# Patient Record
Sex: MALE | Race: WHITE
[De-identification: names, ages, dates, MRNs, and addresses within clinical notes are randomized per-mention and may not be internally consistent; named-entity substitution may affect disease eponyms.]

---

## 2019-07-19 ENCOUNTER — HOSPITAL ENCOUNTER (EMERGENCY)
Dept: HOSPITAL 95 - ER | Age: 74
Discharge: HOME | End: 2019-07-19
Payer: COMMERCIAL

## 2019-07-19 VITALS — BODY MASS INDEX: 36.32 KG/M2 | HEIGHT: 60 IN | WEIGHT: 184.99 LBS

## 2019-07-19 DIAGNOSIS — G40.909: ICD-10-CM

## 2019-07-19 DIAGNOSIS — M41.9: ICD-10-CM

## 2019-07-19 DIAGNOSIS — Z79.899: ICD-10-CM

## 2019-07-19 DIAGNOSIS — Z79.82: ICD-10-CM

## 2019-07-19 DIAGNOSIS — I10: Primary | ICD-10-CM

## 2019-12-15 ENCOUNTER — HOSPITAL ENCOUNTER (EMERGENCY)
Dept: HOSPITAL 95 - ER | Age: 74
Discharge: HOME | End: 2019-12-15
Payer: COMMERCIAL

## 2019-12-15 VITALS — WEIGHT: 170 LBS | HEIGHT: 60 IN | BODY MASS INDEX: 33.38 KG/M2

## 2019-12-15 DIAGNOSIS — R73.03: ICD-10-CM

## 2019-12-15 DIAGNOSIS — Z03.89: Primary | ICD-10-CM

## 2019-12-15 DIAGNOSIS — G40.909: ICD-10-CM

## 2019-12-15 DIAGNOSIS — Z79.899: ICD-10-CM

## 2019-12-15 DIAGNOSIS — Z79.84: ICD-10-CM

## 2019-12-15 DIAGNOSIS — Z79.82: ICD-10-CM

## 2019-12-15 DIAGNOSIS — M41.9: ICD-10-CM

## 2019-12-15 LAB
ALBUMIN SERPL BCP-MCNC: 3.5 G/DL (ref 3.4–5)
ALBUMIN/GLOB SERPL: 0.8 {RATIO} (ref 0.8–1.8)
ALT SERPL W P-5'-P-CCNC: 31 U/L (ref 12–78)
ANION GAP SERPL CALCULATED.4IONS-SCNC: 8 MMOL/L (ref 6–16)
AST SERPL W P-5'-P-CCNC: 27 U/L (ref 12–37)
BASOPHILS # BLD AUTO: 0.02 K/MM3 (ref 0–0.23)
BASOPHILS NFR BLD AUTO: 1 % (ref 0–2)
BILIRUB SERPL-MCNC: 0.3 MG/DL (ref 0.1–1)
BUN SERPL-MCNC: 16 MG/DL (ref 8–24)
CALCIUM SERPL-MCNC: 8.3 MG/DL (ref 8.5–10.1)
CHLORIDE SERPL-SCNC: 104 MMOL/L (ref 98–108)
CO2 SERPL-SCNC: 28 MMOL/L (ref 21–32)
CREAT SERPL-MCNC: 0.92 MG/DL (ref 0.6–1.2)
DEPRECATED RDW RBC AUTO: 46.5 FL (ref 35.1–46.3)
EOSINOPHIL # BLD AUTO: 0.38 K/MM3 (ref 0–0.68)
EOSINOPHIL NFR BLD AUTO: 9 % (ref 0–6)
ERYTHROCYTE [DISTWIDTH] IN BLOOD BY AUTOMATED COUNT: 12.7 % (ref 11.7–14.2)
GLOBULIN SER CALC-MCNC: 4.4 G/DL (ref 2.2–4)
GLUCOSE SERPL-MCNC: 111 MG/DL (ref 70–99)
HCT VFR BLD AUTO: 44.8 % (ref 37–53)
HGB BLD-MCNC: 14.6 G/DL (ref 13.5–17.5)
IMM GRANULOCYTES # BLD AUTO: 0.01 K/MM3 (ref 0–0.1)
IMM GRANULOCYTES NFR BLD AUTO: 0 % (ref 0–1)
LYMPHOCYTES # BLD AUTO: 0.91 K/MM3 (ref 0.84–5.2)
LYMPHOCYTES NFR BLD AUTO: 21 % (ref 21–46)
MCHC RBC AUTO-ENTMCNC: 32.6 G/DL (ref 31.5–36.5)
MCV RBC AUTO: 99 FL (ref 80–100)
MONOCYTES # BLD AUTO: 0.69 K/MM3 (ref 0.16–1.47)
MONOCYTES NFR BLD AUTO: 16 % (ref 4–13)
NEUTROPHILS # BLD AUTO: 2.38 K/MM3 (ref 1.96–9.15)
NEUTROPHILS NFR BLD AUTO: 54 % (ref 41–73)
NRBC # BLD AUTO: 0 K/MM3 (ref 0–0.02)
NRBC BLD AUTO-RTO: 0 /100 WBC (ref 0–0.2)
PLATELET # BLD AUTO: 201 K/MM3 (ref 150–400)
POTASSIUM SERPL-SCNC: 3.5 MMOL/L (ref 3.5–5.5)
PROT SERPL-MCNC: 7.9 G/DL (ref 6.4–8.2)
SODIUM SERPL-SCNC: 140 MMOL/L (ref 136–145)
SP GR SPEC: 1.02 (ref 1–1.02)
UROBILINOGEN UR STRIP-MCNC: (no result) MG/DL

## 2020-02-11 ENCOUNTER — HOSPITAL ENCOUNTER (OUTPATIENT)
Dept: HOSPITAL 95 - LAB SHORT | Age: 75
End: 2020-02-11
Attending: FAMILY MEDICINE
Payer: COMMERCIAL

## 2020-02-11 DIAGNOSIS — R82.90: Primary | ICD-10-CM

## 2020-02-11 LAB
SP GR SPEC: 1.01 (ref 1–1.02)
UROBILINOGEN UR STRIP-MCNC: (no result) MG/DL

## 2021-03-23 ENCOUNTER — HOSPITAL ENCOUNTER (INPATIENT)
Dept: HOSPITAL 95 - ER | Age: 76
LOS: 13 days | Discharge: HOME | DRG: 327 | End: 2021-04-05
Attending: INTERNAL MEDICINE | Admitting: INTERNAL MEDICINE
Payer: COMMERCIAL

## 2021-03-23 VITALS — HEIGHT: 60 IN | WEIGHT: 197.53 LBS | BODY MASS INDEX: 38.78 KG/M2

## 2021-03-23 DIAGNOSIS — E87.70: ICD-10-CM

## 2021-03-23 DIAGNOSIS — K80.20: ICD-10-CM

## 2021-03-23 DIAGNOSIS — G40.909: ICD-10-CM

## 2021-03-23 DIAGNOSIS — Z20.822: ICD-10-CM

## 2021-03-23 DIAGNOSIS — T18.2XXA: Primary | ICD-10-CM

## 2021-03-23 DIAGNOSIS — Z98.49: ICD-10-CM

## 2021-03-23 DIAGNOSIS — Z79.899: ICD-10-CM

## 2021-03-23 DIAGNOSIS — K59.09: ICD-10-CM

## 2021-03-23 DIAGNOSIS — Z79.82: ICD-10-CM

## 2021-03-23 DIAGNOSIS — Z79.84: ICD-10-CM

## 2021-03-23 DIAGNOSIS — Z66: ICD-10-CM

## 2021-03-23 DIAGNOSIS — I11.0: ICD-10-CM

## 2021-03-23 DIAGNOSIS — Z98.1: ICD-10-CM

## 2021-03-23 DIAGNOSIS — F41.9: ICD-10-CM

## 2021-03-23 DIAGNOSIS — E78.5: ICD-10-CM

## 2021-03-23 DIAGNOSIS — I35.1: ICD-10-CM

## 2021-03-23 DIAGNOSIS — M41.9: ICD-10-CM

## 2021-03-23 DIAGNOSIS — R13.10: ICD-10-CM

## 2021-03-23 DIAGNOSIS — E87.6: ICD-10-CM

## 2021-03-23 DIAGNOSIS — I50.42: ICD-10-CM

## 2021-03-23 DIAGNOSIS — E11.9: ICD-10-CM
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
## 2021-03-23 DIAGNOSIS — F81.89: ICD-10-CM

## 2021-03-23 DIAGNOSIS — G47.33: ICD-10-CM

## 2021-03-23 DIAGNOSIS — Z86.73: ICD-10-CM

## 2021-03-23 PROCEDURE — G0378 HOSPITAL OBSERVATION PER HR: HCPCS

## 2021-03-23 PROCEDURE — A9270 NON-COVERED ITEM OR SERVICE: HCPCS

## 2021-03-24 LAB
FLUAV RNA SPEC QL NAA+PROBE: NEGATIVE
FLUBV RNA SPEC QL NAA+PROBE: NEGATIVE
RSV RNA SPEC QL NAA+PROBE: NEGATIVE
SARS-COV-2 RNA RESP QL NAA+PROBE: NEGATIVE

## 2021-03-24 PROCEDURE — 0DJ08ZZ INSPECTION OF UPPER INTESTINAL TRACT, VIA NATURAL OR ARTIFICIAL OPENING ENDOSCOPIC: ICD-10-PCS | Performed by: INTERNAL MEDICINE

## 2021-03-24 NOTE — NUR
SHIFT SUMMARY
S/P DYSPHAGIA, ALERT BUT COMPLETELY NON VERBAL, PT APPEARS TO BE ABLE TO
UNDERSTAND BASIC INSTRUCTIONS REGARDING EXPLANATION OF CARE BEING PROVIDED BY
NURSING STAFF, TOLERATING PUREE CONSISTENCY PO AND MEDS, AMBULATES W/ FWW AND
GB W/ 1 PERSON ASSIST, PLAN TO GO NPO TODAY @ 1300 FOR PROCEDURE, NO ACUTE
EVENTS THIS SHIFT. CALL LIGHT IN REACH, WILL CONTINUE TO MONITOR AND REPORT TO
ONCOMING DAY RN.

## 2021-03-24 NOTE — NUR
03/24/21 6747 Kane Randall
Bite Block Placed. Patient to ENDO 1. History, Chart, Medications and
Allergies reviewed before start of procedure. MONITOR INTACT WITH
CONTINUOUS PULSE OXIMETRY AND INTERMITTENT BP. See Anesthesia record
DR PÉREZ

## 2021-03-24 NOTE — NUR
History, Chart, Medications and Allergies reviewed before start of
procedure.Patient confirms NPO status and agrees with scheduled surgery.
SPOKE TO [facility] HEALTH REPRESENTATIVE. BLOOD CONDENT SIGNED WITH
EDUARDO

## 2021-03-24 NOTE — NUR
SPOKE WITH DR. MACHADO AT THIS TIME, PT TO BE KEPT NPO FOR EGD TODAY. PER DR. MACHADO PT DOES NOT NEED LABS OR ANY FURTHER ORDERS AT THIS TIME.

## 2021-03-24 NOTE — NUR
POST OP: BEDSIDE REPORT RECEIVED FROM SALTY VIVAS. PT TO ROOM AT ABOUT 1820.
UPON ASSESSMENT VSS, PT ALERT, IS NON-VERBAL. PT HAD BM AND BEDDING CHANGED.
AMBULATED TO BATHROOM. PT SOUNDS NASEL CONGESTED AND/OR NEEDS TO COUGH UP
SPUTUM.  ATTEMPTED TO HAVE PT DEEP BREATHE AND COUGH WITHOUT ANY SUCCESS, PT
DOESN'T FOLLOW COMMANDS. SPO2 STABLE AT THIS TIME ON RA, SUCTION SET UP IN
ROOM FOR SAFETY. REPORT GIVEN TO HAZEL URBANO RN.

## 2021-03-25 LAB
ALBUMIN SERPL BCP-MCNC: 3.1 G/DL (ref 3.4–5)
ALBUMIN/GLOB SERPL: 0.9 {RATIO} (ref 0.8–1.8)
ALT SERPL W P-5'-P-CCNC: 31 U/L (ref 12–78)
ANION GAP SERPL CALCULATED.4IONS-SCNC: 4 MMOL/L (ref 6–16)
AST SERPL W P-5'-P-CCNC: 23 U/L (ref 12–37)
BASOPHILS # BLD AUTO: 0.02 K/MM3 (ref 0–0.23)
BASOPHILS NFR BLD AUTO: 0 % (ref 0–2)
BILIRUB SERPL-MCNC: 0.4 MG/DL (ref 0.1–1)
BUN SERPL-MCNC: 13 MG/DL (ref 8–24)
CALCIUM SERPL-MCNC: 8.9 MG/DL (ref 8.5–10.1)
CHLORIDE SERPL-SCNC: 105 MMOL/L (ref 98–108)
CO2 SERPL-SCNC: 30 MMOL/L (ref 21–32)
CREAT SERPL-MCNC: 0.8 MG/DL (ref 0.6–1.2)
DEPRECATED RDW RBC AUTO: 42.7 FL (ref 35.1–46.3)
EOSINOPHIL # BLD AUTO: 0.67 K/MM3 (ref 0–0.68)
EOSINOPHIL NFR BLD AUTO: 9 % (ref 0–6)
ERYTHROCYTE [DISTWIDTH] IN BLOOD BY AUTOMATED COUNT: 12.4 % (ref 11.7–14.2)
GLOBULIN SER CALC-MCNC: 3.6 G/DL (ref 2.2–4)
GLUCOSE SERPL-MCNC: 104 MG/DL (ref 70–99)
HCT VFR BLD AUTO: 35.5 % (ref 37–53)
HGB BLD-MCNC: 12.1 G/DL (ref 13.5–17.5)
IMM GRANULOCYTES # BLD AUTO: 0.01 K/MM3 (ref 0–0.1)
IMM GRANULOCYTES NFR BLD AUTO: 0 % (ref 0–1)
LYMPHOCYTES # BLD AUTO: 1.45 K/MM3 (ref 0.84–5.2)
LYMPHOCYTES NFR BLD AUTO: 20 % (ref 21–46)
MCHC RBC AUTO-ENTMCNC: 34.1 G/DL (ref 31.5–36.5)
MCV RBC AUTO: 94 FL (ref 80–100)
MONOCYTES # BLD AUTO: 0.83 K/MM3 (ref 0.16–1.47)
MONOCYTES NFR BLD AUTO: 12 % (ref 4–13)
NEUTROPHILS # BLD AUTO: 4.25 K/MM3 (ref 1.96–9.15)
NEUTROPHILS NFR BLD AUTO: 59 % (ref 41–73)
NRBC # BLD AUTO: 0 K/MM3 (ref 0–0.02)
NRBC BLD AUTO-RTO: 0 /100 WBC (ref 0–0.2)
PLATELET # BLD AUTO: 206 K/MM3 (ref 150–400)
POTASSIUM SERPL-SCNC: 3.5 MMOL/L (ref 3.5–5.5)
PROT SERPL-MCNC: 6.7 G/DL (ref 6.4–8.2)
PROTHROMBIN TIME: 10.9 SEC (ref 9.7–11.5)
SODIUM SERPL-SCNC: 139 MMOL/L (ref 136–145)

## 2021-03-25 NOTE — NUR
AT ABOUT 1600 PT AMBULATING TO BATHROOM WITH CAREGIVER FROM [facility] FOR
THE HANDICAP. CAREGIVER CALLED REPORTING THAT PT WAS HOLDING ONTO THE BATHROOM
BARS AND STANDING AFTER USING RESTROOM AND BEGAN SLIDING DOWN TO THE GROUND ON
HIS BUTTOCK BEFORE THE CAREGIVER WAS ABLE TO TRANSFER HIM TO A WHEELCHAIR. PT
FOUND SITTING ON THE GROUND WITH GAIT BELT ON. CHARGE RNMAURICIO NOTIFIED AND
CEILING LIFT USED TO TRANSFER PT BACK TO BED. VITAL SIGNS STABLE AND SKIN
ASSESSED WITHOUT ANY INJURY OR BRUISING NOTED. CAREGIVER DENIED THAT PT HIT
HEAD OR ANY OTHER PART OF HIS BODY WITH FALL. THERE HAS NOT BEEN ANY
INDICATION TODAY THAT THE STAFF MEMBER OF [facility] FOR THE HANDICAP HAS NOT
BE ABLE TO COPE WITH PT TRANSFERS. COMMODE PLACED AT PT BEDSIDE AND
CAREGIVER ASKED TO CALL STAFF WHEN PT IS NEEDING TO USE THE RESTROOM OR FOR
ANY TRANSFERS. WILL CTM PT STATUS AND MAKE DR. MACHADO AWARE OF FALL.

## 2021-03-25 NOTE — NUR
SHIFT SUMMARY
 
LYING IN SEMI FOWLERS WITH EYES CLOSED.  HAS BEEN RESTING WELL THIS SHIFT.  PT
IS NONVERBAL WITH DEVELOPMENTAL DELAYS AND SCHOLIOSIS.  RESPIRATIONS EVEN AND
UNLABORED ON RA.  ABDOMEN SOFT AND NONDISTENDED.  BOWEL TONES NOTED IN ALL
QUADS.  SBA TO BATHROOM.  LEFT AC 20G SL PIV IS PATENT, FUSHING WITH EASE.
CAREGIVER STAYED UNTIL 9PM AND THEN WAS OFF DUTY.  A NEW CAREGIVER WILL BE
HERE LATER TODAY.  DENIES FURTHER NEEDS OR WANTS AT THIS TIME.  SAFETY
MEASURES IN PLACE.  WILL CONTINUE TO MONITOR AND GIVE HAND OFF TO ONCOMING
SHIFT USING SBAR DURING BEDSIDE REPORT.

## 2021-03-25 NOTE — NUR
SUMMARY: NO ACUTE CHANGE TODAY. VSS, ALERT AND NON VERBAL, BED ALARM ON FOR
SAFETY AND AMBULATION WITH SBA. ABD IS SOFT AND MODERATLY DISTENDED, WHEN
PALPATED APPEARS TO BE NON TENDER. PT HAS HAD SEVERAL VOIDS AND BMS TODAY. PT
AWAITED SURGICAL CONSULT TODAY, DR. ROBERTSON TO SEE PT IN THE MORNING. PT ABLE
TO HAVE CLEAR LIQ NOW AND NPO AT 0100. HOSPITALIST CONSULT CALLED BY DR. MACHADO, SEE ORDER. PT CAREGIVERS ATTENTIVE AT BEDSIDE. WILL CTM AND REPORT TO
NOC RN.

## 2021-03-25 NOTE — NUR
WALKED INTO ROOM TO PT ON BATHROOM FLOOR WITH CAREGIVER IN THE BATHROOM,
NURSES
NOTIFIED USED CELING LIFT GOT PT INTO BED, VITALS TAKEN.

## 2021-03-26 PROCEDURE — 0DC60ZZ EXTIRPATION OF MATTER FROM STOMACH, OPEN APPROACH: ICD-10-PCS | Performed by: SURGERY

## 2021-03-26 NOTE — NUR
History, Chart, Medications and Allergies reviewed before start of
procedure.Pre-Op teaching done. Pt's caregiver verbalizes understanding.

## 2021-03-26 NOTE — NUR
SUMMARY
 PT ACCIDENTALLY DISLODGED IV BY BENDING L ARM EVEN WITH ARM BOARD IN PLACE.
DCCD WITH CATH INTACT.PT VOIDED X1 BEGINNING OF SHIFT. NO VOID SINCE.
CAREGIVER ADVISED THIS IS BASELINE FOR THIS PT.I DID BLADDER SCAN THIS AM
WITH 726 RESULT.

## 2021-03-27 LAB
ALBUMIN SERPL BCP-MCNC: 2.9 G/DL (ref 3.4–5)
ALBUMIN/GLOB SERPL: 0.7 {RATIO} (ref 0.8–1.8)
ALT SERPL W P-5'-P-CCNC: 38 U/L (ref 12–78)
ANION GAP SERPL CALCULATED.4IONS-SCNC: 6 MMOL/L (ref 6–16)
AST SERPL W P-5'-P-CCNC: 38 U/L (ref 12–37)
BASOPHILS # BLD AUTO: 0.02 K/MM3 (ref 0–0.23)
BASOPHILS NFR BLD AUTO: 0 % (ref 0–2)
BILIRUB SERPL-MCNC: 0.5 MG/DL (ref 0.1–1)
BUN SERPL-MCNC: 10 MG/DL (ref 8–24)
CALCIUM SERPL-MCNC: 8.6 MG/DL (ref 8.5–10.1)
CHLORIDE SERPL-SCNC: 107 MMOL/L (ref 98–108)
CO2 SERPL-SCNC: 25 MMOL/L (ref 21–32)
CREAT SERPL-MCNC: 0.79 MG/DL (ref 0.6–1.2)
DEPRECATED RDW RBC AUTO: 43.8 FL (ref 35.1–46.3)
EOSINOPHIL # BLD AUTO: 0 K/MM3 (ref 0–0.68)
EOSINOPHIL NFR BLD AUTO: 0 % (ref 0–6)
ERYTHROCYTE [DISTWIDTH] IN BLOOD BY AUTOMATED COUNT: 12.6 % (ref 11.7–14.2)
GLOBULIN SER CALC-MCNC: 4.1 G/DL (ref 2.2–4)
GLUCOSE SERPL-MCNC: 146 MG/DL (ref 70–99)
HCT VFR BLD AUTO: 38.7 % (ref 37–53)
HGB BLD-MCNC: 13 G/DL (ref 13.5–17.5)
IMM GRANULOCYTES # BLD AUTO: 0.05 K/MM3 (ref 0–0.1)
IMM GRANULOCYTES NFR BLD AUTO: 0 % (ref 0–1)
LYMPHOCYTES # BLD AUTO: 1.04 K/MM3 (ref 0.84–5.2)
LYMPHOCYTES NFR BLD AUTO: 8 % (ref 21–46)
MCHC RBC AUTO-ENTMCNC: 33.6 G/DL (ref 31.5–36.5)
MCV RBC AUTO: 95 FL (ref 80–100)
MONOCYTES # BLD AUTO: 1.56 K/MM3 (ref 0.16–1.47)
MONOCYTES NFR BLD AUTO: 12 % (ref 4–13)
NEUTROPHILS # BLD AUTO: 10.02 K/MM3 (ref 1.96–9.15)
NEUTROPHILS NFR BLD AUTO: 79 % (ref 41–73)
NRBC # BLD AUTO: 0 K/MM3 (ref 0–0.02)
NRBC BLD AUTO-RTO: 0 /100 WBC (ref 0–0.2)
PLATELET # BLD AUTO: 220 K/MM3 (ref 150–400)
POTASSIUM SERPL-SCNC: 4.1 MMOL/L (ref 3.5–5.5)
PROT SERPL-MCNC: 7 G/DL (ref 6.4–8.2)
SODIUM SERPL-SCNC: 138 MMOL/L (ref 136–145)

## 2021-03-27 NOTE — NUR
SHIFT SUMMARY
PT WAS UP TO CHAIR TODAY. PT BEEN MED PRN FOR PAIN. PT BLE ELEVATED IN CHAIR.
PT HAVING RAPID SHALLOW BREATHING AT TIMES, DR AWARE, REPORTS PT NOT TAKING
DEEP BREATH. PT WAS SUCTIONED AT TIMES ALTHOUGH OTHER TIMES PT WOULD NOT ALLOW
US TO SUCTION HIM. PT IV INFILTRATED EARLIER TODAY, NEW IV ACCESS PLACED THIS
EVENING. DISCUSSED PT'S STATUS MULT TIMES TODAY. MULT DR TO SEE PT TODAY. PT
BEEN ASSISTED WITH ADL'S MULT TIMES TODAY. PT CONT TO BE NPO, IVF IN PLACE. PT
WAS MED WITH LASIX EARLIER TODAY, WHICH PT URINE MUCH CLEARER SINCE THEN. PT
IN BED AT THIS TIME. CARE GIVER IN ROOM. BED ALARM IN PLACE. ABD BINDER IN
PLACE. KAREN IN PLACE, AUNG IN PLACE.

## 2021-03-27 NOTE — NUR
PT HAVING RAPID SHALLOW BREATHING. PT APPEARED IN PAIN, MEDICATED FOR PAIN. DR QUINN NOTIFIED. VS REASSESSED. PT CONT HAVE SHALLOW BREATHING, BIOX 90% ON RA.
HR SLIGHTLY BETTER AS WELL. PT CONT TO BE UP IN CHAIR, RECENTLY REPOSITIONED
WITH ASSIST. URINE IN FLEMING HAS APPEARED C/Y WHICH RN ALSO DISCUSSED WITH DR QUINN.

## 2021-03-27 NOTE — NUR
OTHER RN PLACED IV ACCESS. OTHER RN ASSISTED WITH PT WHILE STARING IV. PT
REPORTED TO TOLERATE WELL.

## 2021-03-27 NOTE — NUR
DISCUSSED THAT DR ROBERTSON REPORTED PT MAY HAVE MEDS WITH SMALL AMT OF LIQUID
WITH DR QUINN. DISCUSSED MEDICATIONS WITH DR QUINN. DISCUSSED PT'S STATUS WITH
DR QUINN. PT UP TO CHAIR WITH MULT ASSIST, ASSISTED WITH ADL'S PRN.

## 2021-03-27 NOTE — NUR
SHIFT SUMMARY
POD#1 EXP LAP. DEVELOPMENTALLY DELAYED + NONVERBAL. FLACC SCALED USED TO
MEDICATE PT WITH 25mcg FENTANYL Q2H. NO EMESIS. NPO SINCE OR. ABD INCISION
WITH KAREN SCANT RED DRAINAGE. AUNG SECURE WITH 60cc SS OUT. FLEMING WITH DARK TEA
COLORED URINE OUT. IVF PER ORDERS. PT WITH ELEVATED HEART RATE NOTED THIS AM,
DECREASES WITH PAIN MEDICATION. X1 LARGE BM THIS SHIFT. BED ALARM ON FOR
SAFETY.

## 2021-03-27 NOTE — NUR
PT BACK TO BED WITH MULT ASSIST. OTHER RN HERE TO ASSESS IV ACCESS. CHARGE RN
AND OTHER RN ASSISTING WITH PT. PT APPEARS MORE COMFORTABLE IN BED.

## 2021-03-27 NOTE — NUR
DR QUINN RECENTLY HERE. PT MED WITHOUT DIFFICULTY WITH PT UP IN CHAIR, MEDS
CRUSHED IN APPLESAUCE WITHOUT DIFFICULTY.

## 2021-03-28 NOTE — NUR
SHIFT SUMMARY
POD#2 EXP LAP WITH AUNG PLACEMENT. DEVELOPMENTALLY DELAYED. NONVERBAL. NPO. PT
UNABLE TO REPORT NEEDS. PT MEDICATED WITH 25mcg FENTANYL WHEN FLACC SCALE
INCREASES WITH POSITIVE EFFECTS. NO EMESIS. ABD INCISION WITH KAREN, SMALL
AMOUNT DRY RED DRAINAGE NOTED ALONG INCISION, NO INCREASE THIS SHIFT. ABD
BINDER IN PLACE. ON RA T/O NIGHT, RESPIRATIONS 20s, CONT PULSE OXIMETRY IN
PLACE. PT CONTINUES WITH UPPER AIRWAY SOUND OF FLEM, PT REFUSING TO OPEN MOUTH
FOR SUCTIONING. PT REPOSITIONED FREQUENTLY T/O NIGHT. FLEMING SECURE WITH YELLOW
URINE. BED ALARM ON FOR SAFETY. PT CURRENTLY RESTING IN BED WITH CALL LIGHT IN
REACH.

## 2021-03-28 NOTE — NUR
SHIFT SUMMARY:
 
PT ALERT, NONVERBAL, CONTINUES TO RECEIVE O2 VIA NC, FREQUENTLY PULLING NC
OFF, O2 SATS RANGING 88-93% DEPENDING ON HOW LONG PT KEEPS NC ON. PT WITH
SHALLOW, TACHYPNEIC BREATHING. PT CONTINUES NPO EXCEPT TO TAKE MEDICATIONS. IV
FLUIDS INFUSE REGULARLY W/OUT DIFFICULTY. AUNG DRESSING SOILED, NEW GAUZE
DRESSING APPLIED, ABD BINDER IN PLACE. FLEMING CATHETER CONTINUES, DRAINING TO
GRAVITY. PT REPOSITIONED OFTEN IN BED.
REPORT GIVEN TO SALTY BOWMAN.

## 2021-03-29 NOTE — NUR
PT BACK FROM IMAGING, UNABLE TO PREFORM UPPER ENDOSCOPY, PT NOT ABLE TO FOLLOW
DIRECTIONS TO SWALLOW.  DR. ROBERTSON IN PT ROOM AT THIS TIME. MADE AWARE THAT
IMAGING WAS NOT ABLE TO BE COMPLETED. DR. ROBERTSON ALSO MADE AWARE OF DRAINAGE
FROM AUNG, AMT AND COLOR (SEE CHARTING) AS WELL AS LOW URINE OUTPUT FROM LAST
NOC.  SEE NEW ORDER. PT APPEARS TO HAVE TROUBLE SWOLLOWING, THIS RN NOT SURE
OF PT BASELINE FOR SWALLOW, ASKED DR. ROBERTSON FOR SPEECH EVAL/THERAPY, NO NEW
ORDER. PER DR. ROBERTSON KAREN, DRESSING TO BE LEFT AS IT IS AT THIS TIME, NO
ORDERS TO RE DRESS. KAREN IS IN PLACE, WITHOUT CANISTER- PT PULLED LAST NOC.
OTHERWISE SITE APPEARS WNL.

## 2021-03-29 NOTE — NUR
SPOKE WITH DR. COYLE AT THIS TIME CONCERNING PT COURSE LUNG SOUNDS AND
GURGLING AT THROAT, AWAITING CHEST XRAY AT THIS TIME. PER SPEECH THERAPY - PT
TO BE STRICT NPO UNTIL XRAY EVALUATED. PT VSS AT THIS TIME. SP02 94% ON RA.
DR. COYLE ALSO MADE AWARE OF LOW URINAY OUTPUT, SEE NEW ORDERS. WILL CTM PT
STATUS.

## 2021-03-29 NOTE — NUR
SUMMARY: PT IS POD3 FOR SURGICAL REMOVAL OF FOREIGN BODY.  NO ACUTE CHANGE
TODAY, SEE PREVIOUS NOTES. VSS- DR. COYLE AWARE OF HTN AND TACHYCARDIA .
SURGICAL SITE WNL AND TOTAL OF 90ML OUT OF AUNG,SS DRAINAGE. CHEST X-RAY
COMPLETED, PT CONTINUES TO HAVE GURGLING IN THROAT AND COURSE LUNGS, DR. COYLE
AWARE-SPEECH EVALUATED TODAY AND MADE PT NPO-SEE THERAPY NOTES. PT ENCOURAGED
TO COUGH AND DEEP BREATHE, BUT PT DOES NOT FOLLOW COMMANDS. SP02 BETWEEN
93-96% TODAY ON RA, BI OX IN PLACE.  PT TURNED Q2 AND SAT AT 90 DEGREES, BED
ALARM FOR HIGH FALL RISK.  REPORT GIVEN TO HAZEL MALAVE RN.

## 2021-03-29 NOTE — NUR
LABS UNABLE TO BE DRAWN THIS MORNING. HR  WITH VITALS RETAKEN NOW, PT
CONTINUES TO HAVE TROUBLE SWALLOWING.
DR. COYLE MADE AWARE OF ALL OF THE ABOVE AT THIS TIME.

## 2021-03-29 NOTE — NUR
SHIFT SUMMARY
POD#3 EXP LAP. DEVELOPMENTALLY DELAYED. NONVERBAL. NPO EXCEPT MEDS CRUSHED
WITH APPLESAUCE. PT RESTED WELL T/O MOST OF NIGHT. REPOSITIONED FOR COMFORT +
ASSIST BREATHING. RESPIRATIONS IN 20s, ON RA. UPPER AIRWAY "RATTLING" WITH NO
ACUTE CHANGE THIS SHIFT. AUNG WITH LARGE AMOUNTS OF SS OUT. HEART RATE 110s TO
120s, MD AWARE AND METOPROLOL STARTED PER ORDERS. NO ACUTE CHANGES OVER NIGHT.
PT CURRENTLY RESTING IN BED WITH BED ALARM ON + CALL LIGHT IN REACH.

## 2021-03-30 NOTE — NUR
PT APPEARS TO HAVE MORE GENERALIZED EDEMA TODAY. DR. COYLE MADE AWARE, SEE
ORDER. ALSO DISCUSSED PT PO INTAKE WITH DR. COYLE, PT HAS HAD FAILED SWALLOW
EVALS THE LAST 2 DAYS AND HAS REMAINED NPO WITH NO MEDS. NO NEW ORDERS AT
THIS TIME, NS INFUSING.

## 2021-03-30 NOTE — NUR
SHIFT SUMMARY
 
NO ACUTE CHANGES THIS SHIFT, NO INDICATIONS OF PAIN/DISCOMFORT, AWAKE T/O THE
NIGHT WATCHING TV, REPOS Q2, VSS, BEDRESTING AT THIS TIME, BED ALARM ACTIVE,
WILL CONT TO MONITOR UNTIL REPORT GIVEN TO DAY RN.

## 2021-03-30 NOTE — NUR
SUMMARY: NO ACUTE CHANGE TODAY. VSS, PT ALERT, NON VERBAL. SURGICAL SITE WNL,
TOTAL OF 80ML OF SS DRAINAGE OUT OF AUNG DRAIN. ABD BINDER IN PLACE. SPEECH
RE-EVAULATED PT, PT REMAINS NPO FOR HIGH RISK ASPIRATION, SEE SPEECH NOTE. PT
HAD MOIST COUGHING AND GURGLING SOUNDS FROM THROAT AFTER SPEECH EVAL. PT DOES
NOT CLEAR THROAT OR COUGH ON REQUEST, SUCTION AT BEDSIDE. SP02 CONTINUES TO BE
WNL ON RA. NS INFUSING. PT TURNED Q2 PRN AND HOB ELEVATED. URINE APPEARS
LIGHTER IN COLOR TODAY. NO ACUTE CONCERNS CURRENTLY.

## 2021-03-31 NOTE — NUR
SHIFT SUMMARY
POD5 EXLAP/GASTROTOMY W/ FOREIGN BODY REMOVAL, ALERT, NONVERBAL, ORIENTED TO
SELF, COOPERATIVE W/ CARE, STRICT NPO PER SLP RECOMMENDATION, ANOTHER SWALLOW
EVALUATION TO BE DONE IN THE MORNING, DRESSING INTACT UNDER ABD BINDER, AUNG
DRAINING SEROUS CLEAR YELLOW FLUID. NO ACUTE EVENTS THIS SHIFT. CALL LIGHT IN
REACH, WILL CONTINUE TO MONITOR AND REPORT TO ONCOMING DAY RN.

## 2021-03-31 NOTE — NUR
SHIFT SUMMARY:
 
PT HAS NOT EATEN DINNER SINCE HE VOMITED AFTER LUNCH. PT BACK IN BED,
CAREGIVER AT BEDSIDE AND STATES PT MAKES GROWLING NOISE ONCE IN A WHILE AT
BASELINE. CAREGIVER STATES PT COMMUNICATES WHEN HE DOESN'T LIKE SOMETHING AND
OCCASIONALLY SMILES. LUNG SOUNDS REMAIN CLEAR. ABDOMEN DISTENDED BUT DOES NOT
SEEM TENDER FOR PT.

## 2021-03-31 NOTE — NUR
SPEECH CAME TO EVALUATE PT. SHE WANTED TO TRY TO GET PT UP TO CHAIR BUT
CONTACTED CARE FACILITY WHO STATED THAT HE IS NORMALLY VERY WEAK REQUIRING
PIVOT TRANSFERS AND IS EVEN WEAKER NOW. PLAN IS TO CALL ST WHEN CARE GIVER
ARRIVES SO THAT WE HAVE ASSISTANCE GETTING PT UP TO CHAIR FOR SPEECH
EVALUATION. CNA AWARE OF THIS PLAN AS WELL.

## 2021-04-01 NOTE — NUR
SHIFT SUMMARY
PT ON CLEAR DIET BUT HAS NOT BEEN EATEN DINNER PER DAY SHIFT NURSE. PT VOMITED
ONCE AT 2235, AND HAD A BM. PT'S LUNG SOUNDS APPEARS TO COARSE. CALLED
RESPIRATORY CARE FOR SUCTIONING. LUNG SOUNDS IMPROVED BUT STILL HAS COARSE
SOUNDS. VSS. PT WAS NOT DESATURATING WHEN VOMITED AND DURING SUCTIONING. PT IS
NONVERBAL, REMAIN TO MAKE GROWLING NOISE. ABD REMAIN DISTENDED. PT HAS TAKEN
HIS NIGHT MEDS, CRUSHED WITH APPLE SAUCE WITH NO ISSUES/PROBLEM.  CALL LIGHT
WITHIN REACH, WILLL CONTINUE TO MONITOR PT. WILL ADDRESS CHANGES WITH THE
UPCOMING AM NURSE. CALL LIGHT WITHIN REACH.

## 2021-04-01 NOTE — NUR
SHIFT SUMMARY
PT NON-VERBAL WITH DEVELOPMENTAL DELAY. ABD SURGERY ON 3/26. PT VOMITTED THE
NIGHT PRIOR AND HAS HAD A LOT OF GURGLING AND WHEEZING. SUCTION PRN. ORAL
MEDICATIONS HELD DUE TO THE PATIENT NOT BEING ALERT ENOUGH TO TAKE MEDICATION
W/OUT ASPIRATING. HAS BEEN ABLE TO EAT LUNCH AND DINNER AS A FEEDER AND
FEEDING SMALL AMOUNTS OF FOOD AT A TIME. MULTIPLE LOOSE BM'S TODAY. RASH/SCAR
NOTED ON CHEST THAT SEEMS TO WORSEN WHEN THE PATIENT GETS WARM. CAREGIVER AT
THE BEDSIDE THIS AM SAID THIS IS NORMAL FOR HIM AND THE RASH IS A SCAR. PT
CURRENTLY RESTING COMFORTABLY IN BED WITH PM CAREGIVER AT THE BEDSIDE.

## 2021-04-01 NOTE — NUR
PT ACTIVELY VOMITED AT 2235. LUNGS SOUNDS ARE COARSE. VSS, PT SATURATING OVER
94% ON ROOM AIR. HE ALSO HAD A BM. RESPIRATORY CARE CALLED IN FOR SUCTIONING.
PT LUNG SOUNDS HAVE IMPROVED BUT REMAIN COARSE. WILL CONTINUE TO MONITOR PT.
PT ON HIGH FOLWER POSITION AT THIS TIME.

## 2021-04-02 NOTE — NUR
SHIFT SUMMARY
POD7 EXPLORATORY LAPAROTOMY, GASTROTOMY AND REMOVAL OF FOREIGN BODY BY DR. REGAN. PT ALERT, REMAIN NON-VERBAL. HE RESPOND "UHUH" WHEN I ASKED IF HE IS
COMFORTABLE AFTER REPOSITIONING. HE ALSO TRIED TO LIFT RIGHT ARM TO TUCK A
PILLOW UNDERNEATH. PT HAS LESS GURLING SOUND TONIGHT. HE WOULD TAKE DEEP COUGH
INTERMITTENTLY TO CLEAR PHELGM/THROAT. HE ALSO HAD 1 LARGE LIQ BOWEL MOVEMENT.
PT TOLERATING PO INTAKE W/ MED CRUSHED AND APPLESAUCE. NO VOMITING. VSS. LUNG
HAS IMPROVED THIS MORNING. SOUNDED MORE CLEAR. ABD REMAIN DISTENDED. SURGICAL
SITE CDI. REDNESS ON HIS CHEST AND ARM HAS RESOLVED.  CALL LIGHT WITHIN REACH.

## 2021-04-02 NOTE — NUR
SHIFT SUMMARY
PT SEEMS TO BE DOING BETTER TODAY THAN YX. HE IS MORE INTERACTIVE AND IS
EATING MORE. HE IS TOLERATING HIS PUREED DIET WELL AT THE MOMENT. SEROUS
DRAINAGE NOTED AT OLD UANG SITE AND DRESSING CHANGED X2 THIS SHIFT. PT WORKED
WITH O/T TODAY AND O/T BELIEVES THAT THE PT IS AT HIS BASELINE. VSS.

## 2021-04-03 NOTE — NUR
SUMMARY: PT IS S/P EX LAP WITH FOREIGN BODY REMOVAL. VSS, ALERT. NO ACUTE
CHANGE TODAY. SUGICAL SITE WNL, OLD AUNG DRAIN SITE APPEARS TO NOT BE DRAINING
AS MUCH, DRESSING CHANGED X1. PT REPOSITIONED Q2 AND SAT UP RIGHT IN CHAIR
SETTING IN BED FOR MEALS. PT TOLERATED MEALS WELL, NO COUGHING OBSERVED. PT
DOES GRUNT, WHICH IS BASELINE FOR HIM PER CARETAKER. LONNIE DC'D AT ABOUT 1430,
AWAITING VOID. NO ACUTE CONCERNS AT THIS TIME.

## 2021-04-03 NOTE — NUR
SHIFT SUMMARY:
POD 8 EXPLORITY LAP
PATIENT IS ALERT AND REMAINS NONVERBAL. HE MAKES GRUNTING NOISES WHEN WANTING
TO BE REPOSITIONED. VS ARE WNL AND IS ON RA. PATIENT DENIES PAIN. HIS LUNG
SOUNDS ARE DIMINISHED BUT WITH DECREASED GURGLING SOUND TONIGHT. HE IS A MAX
ASSIST WITH THE LIFT. HE HAD A BM EARLIER IN THE SHIFT. HE IS TOLERATING
NECTAR THICK PO INTAKE AND NEEDS TO HAVE A FEEDER. HIS PILLS ARE CRUSHED AND
GIVEN IN APPLESAUCE. OLD AUNG SITE HAS INCREASED YELLOW OUTPUT AND HAS BEEN
CHANGED 2X SO FAR TONIGHT.
CALL LIGHT IS WITHIN REACH.

## 2021-04-04 LAB
ALBUMIN SERPL BCP-MCNC: 2.1 G/DL (ref 3.4–5)
ANION GAP SERPL CALCULATED.4IONS-SCNC: 5 MMOL/L (ref 6–16)
BUN SERPL-MCNC: 11 MG/DL (ref 8–24)
CALCIUM SERPL-MCNC: 8 MG/DL (ref 8.5–10.1)
CHLORIDE SERPL-SCNC: 111 MMOL/L (ref 98–108)
CO2 SERPL-SCNC: 28 MMOL/L (ref 21–32)
CREAT SERPL-MCNC: 0.85 MG/DL (ref 0.6–1.2)
DEPRECATED RDW RBC AUTO: 45.9 FL (ref 35.1–46.3)
ERYTHROCYTE [DISTWIDTH] IN BLOOD BY AUTOMATED COUNT: 13.3 % (ref 11.7–14.2)
GLUCOSE SERPL-MCNC: 99 MG/DL (ref 70–99)
HCT VFR BLD AUTO: 36.5 % (ref 37–53)
HGB BLD-MCNC: 12 G/DL (ref 13.5–17.5)
MCHC RBC AUTO-ENTMCNC: 32.9 G/DL (ref 31.5–36.5)
MCV RBC AUTO: 95 FL (ref 80–100)
NRBC # BLD AUTO: 0 K/MM3 (ref 0–0.02)
NRBC BLD AUTO-RTO: 0 /100 WBC (ref 0–0.2)
PHOSPHATE SERPL-MCNC: 3.4 MG/DL (ref 2.5–4.9)
PLATELET # BLD AUTO: 302 K/MM3 (ref 150–400)
POTASSIUM SERPL-SCNC: 3 MMOL/L (ref 3.5–5.5)
SODIUM SERPL-SCNC: 144 MMOL/L (ref 136–145)

## 2021-04-04 NOTE — NUR
SUMMARY: NO CHANGE TODAY, VSS. SURGICAL SITE WNL. PT SAT UP FOR MEALS AND ATE
WELL. LIFT USED TO TRANSFER PT TO CHAIR. PT IS VOIDING WELL AND HAD SMEAR OF
BM. OLD AUNG SITE HAS MINIMAL DRAINAGE, DRESSING CDI CURRENTLY. CAREGIVER IN
ROOM NOW FEEDING PT DINNER. NO SAFETY CONCERNS, PLAN IS FOR PT TO DC HOME
TOMORROW.

## 2021-04-04 NOTE — NUR
SHIFT SUMARRY
 
LYING IN HIGH FOWLERS WITH EYES CLOSED WITH TV ON IN BACKGROUND.  HAS SOAKED
HIS ATTENDS SEVERAL TIMES THIS SHIFT.  NO SIGNIFICANT CHANGES NOTED.  SL LEFT
UPPER ARM POWERGLIDE DOES NOTE DRAW, BUT EACH LUMEN FLUSH WITH EASE.
RESPIRATIONS COARSE AND DIMINSHED IN BASES BILATERALLY.  DRESSING TO LEFT ABD
CHANGED AS NEEDED FOR SEROUS FLUID DRAINING FROM OLD AUNG SITE.  HAS DENIES
PAIN, DISCOMFORT, OR FURTHER NEEDS AT THIS TIME.  SAFETY MEASURES IN PLACE.
WILL CONTINUE TO MONITOR AND GIVE HAND OFF TO ONCOMING SHIFT USING SBAR DURING
BEDSIDE REPORT.

## 2021-04-05 LAB
ALBUMIN SERPL BCP-MCNC: 1.8 G/DL (ref 3.4–5)
ANION GAP SERPL CALCULATED.4IONS-SCNC: 6 MMOL/L (ref 6–16)
BUN SERPL-MCNC: 12 MG/DL (ref 8–24)
CALCIUM SERPL-MCNC: 7.7 MG/DL (ref 8.5–10.1)
CHLORIDE SERPL-SCNC: 109 MMOL/L (ref 98–108)
CO2 SERPL-SCNC: 29 MMOL/L (ref 21–32)
CREAT SERPL-MCNC: 0.77 MG/DL (ref 0.6–1.2)
GLUCOSE SERPL-MCNC: 84 MG/DL (ref 70–99)
PHOSPHATE SERPL-MCNC: 2.8 MG/DL (ref 2.5–4.9)
POTASSIUM SERPL-SCNC: 4.2 MMOL/L (ref 3.5–5.5)
SODIUM SERPL-SCNC: 144 MMOL/L (ref 136–145)

## 2021-04-05 NOTE — NUR
SHIFT SUMARRY
 
LYING IN HIGH FOWLERS WITH EYES OPEN WITH TV ON.  NO SIGNIFICANT CHANGES
NOTED.  SL LEFT UPPER ARM POWERGLIDE IS PATENT, EACH LUMEN FLUSH WITH EASE
WITHOUT DRAW BACK.  RESPIRATIONS COARSE AND DIMINSHED IN BASES BILATERALLY.
DRESSING TO LEFT ABD CHANGED AS NEEDED FOR SEROUS FLUID DRAINING FROM OLD AUNG
SITE.  HAS DENIES PAIN, DISCOMFORT, OR FURTHER NEEDS AT THIS TIME.  SAFETY
MEASURES IN PLACE.  WILL CONTINUE TO MONITOR AND GIVE HAND OFF TO ONCOMING
SHIFT USING SBAR DURING BEDSIDE REPORT.

## 2021-04-05 NOTE — NUR
DISHCARGE
PT LEFT VIA PERSONAL WHEELCHAIR WITH CAREGIVERS FROM [facility]. DISCHARGE
INSTRUCTIONS GONE OVER WITH CAREGIVERS, DRESSING OVER PREVIOUS DRAIN
SITE CHANGED. ALL BELONGINGS WITH PATIENT AND CAREGIVER. DECLINED FURTHER
QUESTIONS AT THIS TIME. POWERGLIDE REMOVED PRIOR TO DISCHARGE. PRESCRIPTION
CALLED TO PHARMACY.

## 2021-04-05 NOTE — NUR
ASSUMED CARE:
 
PT POSITIONED HIGH FOWLERS IN BED. CNA AT BEDSIDE FEEDING PT YOGURT DUE TO
DECREASED CBG IN 50S. PT TOLERATING WELL AT THIS TIME.

## 2021-04-05 NOTE — NUR
PT'S POWERGLIDE REMOVED BY NURSE WNL. DC INSTRUCTIONS GIVEN TO CAREGIVER AND
DIRECTOR OF [facility]. PT TRANSFERRED OUT TO FACILITY VAN VIA WHEEL CHAIR
BY HOSPITAL STAFF. FACILITY STAFF WITHOUT ANY FURTHER QUESTIONS OR CONCERNS.

## 2021-05-29 ENCOUNTER — HOSPITAL ENCOUNTER (OUTPATIENT)
Dept: HOSPITAL 95 - ER | Age: 76
Setting detail: OBSERVATION
LOS: 2 days | Discharge: HOME | End: 2021-05-31
Attending: HOSPITALIST | Admitting: HOSPITALIST
Payer: COMMERCIAL

## 2021-05-29 VITALS — WEIGHT: 189.6 LBS | BODY MASS INDEX: 37.22 KG/M2 | HEIGHT: 60 IN

## 2021-05-29 DIAGNOSIS — M41.80: ICD-10-CM

## 2021-05-29 DIAGNOSIS — F81.9: ICD-10-CM

## 2021-05-29 DIAGNOSIS — Z79.84: ICD-10-CM

## 2021-05-29 DIAGNOSIS — Z86.73: ICD-10-CM

## 2021-05-29 DIAGNOSIS — I50.42: ICD-10-CM

## 2021-05-29 DIAGNOSIS — K59.09: ICD-10-CM

## 2021-05-29 DIAGNOSIS — Z66: ICD-10-CM

## 2021-05-29 DIAGNOSIS — E78.5: ICD-10-CM

## 2021-05-29 DIAGNOSIS — Z98.1: ICD-10-CM

## 2021-05-29 DIAGNOSIS — R13.10: ICD-10-CM

## 2021-05-29 DIAGNOSIS — T17.820A: Primary | ICD-10-CM

## 2021-05-29 DIAGNOSIS — G40.909: ICD-10-CM

## 2021-05-29 DIAGNOSIS — E11.9: ICD-10-CM
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
## 2021-05-29 DIAGNOSIS — F41.9: ICD-10-CM

## 2021-05-29 DIAGNOSIS — F50.89: ICD-10-CM

## 2021-05-29 DIAGNOSIS — I11.0: ICD-10-CM

## 2021-05-29 LAB
ALBUMIN SERPL BCP-MCNC: 3.2 G/DL (ref 3.4–5)
ALBUMIN/GLOB SERPL: 0.8 {RATIO} (ref 0.8–1.8)
ALT SERPL W P-5'-P-CCNC: 24 U/L (ref 12–78)
ANION GAP SERPL CALCULATED.4IONS-SCNC: 5 MMOL/L (ref 6–16)
AST SERPL W P-5'-P-CCNC: 18 U/L (ref 12–37)
BASOPHILS # BLD AUTO: 0.05 K/MM3 (ref 0–0.23)
BASOPHILS NFR BLD AUTO: 1 % (ref 0–2)
BILIRUB SERPL-MCNC: 0.1 MG/DL (ref 0.1–1)
BUN SERPL-MCNC: 13 MG/DL (ref 8–24)
CALCIUM SERPL-MCNC: 8.6 MG/DL (ref 8.5–10.1)
CHLORIDE SERPL-SCNC: 99 MMOL/L (ref 98–108)
CO2 SERPL-SCNC: 30 MMOL/L (ref 21–32)
CREAT SERPL-MCNC: 1.03 MG/DL (ref 0.6–1.2)
DEPRECATED RDW RBC AUTO: 46.6 FL (ref 35.1–46.3)
EOSINOPHIL # BLD AUTO: 0.8 K/MM3 (ref 0–0.68)
EOSINOPHIL NFR BLD AUTO: 12 % (ref 0–6)
ERYTHROCYTE [DISTWIDTH] IN BLOOD BY AUTOMATED COUNT: 13.4 % (ref 11.7–14.2)
GLOBULIN SER CALC-MCNC: 4.2 G/DL (ref 2.2–4)
GLUCOSE SERPL-MCNC: 117 MG/DL (ref 70–99)
HCT VFR BLD AUTO: 38.9 % (ref 37–53)
HGB BLD-MCNC: 13 G/DL (ref 13.5–17.5)
IMM GRANULOCYTES # BLD AUTO: 0.02 K/MM3 (ref 0–0.1)
IMM GRANULOCYTES NFR BLD AUTO: 0 % (ref 0–1)
LYMPHOCYTES # BLD AUTO: 1.6 K/MM3 (ref 0.84–5.2)
LYMPHOCYTES NFR BLD AUTO: 23 % (ref 21–46)
MCHC RBC AUTO-ENTMCNC: 33.4 G/DL (ref 31.5–36.5)
MCV RBC AUTO: 95 FL (ref 80–100)
MONOCYTES # BLD AUTO: 0.94 K/MM3 (ref 0.16–1.47)
MONOCYTES NFR BLD AUTO: 14 % (ref 4–13)
NEUTROPHILS # BLD AUTO: 3.55 K/MM3 (ref 1.96–9.15)
NEUTROPHILS NFR BLD AUTO: 51 % (ref 41–73)
NRBC # BLD AUTO: 0 K/MM3 (ref 0–0.02)
NRBC BLD AUTO-RTO: 0 /100 WBC (ref 0–0.2)
PLATELET # BLD AUTO: 249 K/MM3 (ref 150–400)
POTASSIUM SERPL-SCNC: 4.3 MMOL/L (ref 3.5–5.5)
PROT SERPL-MCNC: 7.4 G/DL (ref 6.4–8.2)
SODIUM SERPL-SCNC: 134 MMOL/L (ref 136–145)
TROPONIN I SERPL-MCNC: <0.015 NG/ML (ref 0–0.04)

## 2021-05-29 PROCEDURE — G0378 HOSPITAL OBSERVATION PER HR: HCPCS

## 2021-05-29 PROCEDURE — A9270 NON-COVERED ITEM OR SERVICE: HCPCS

## 2021-05-30 LAB
ALBUMIN SERPL BCP-MCNC: 3 G/DL (ref 3.4–5)
ALBUMIN/GLOB SERPL: 0.8 {RATIO} (ref 0.8–1.8)
ALT SERPL W P-5'-P-CCNC: 20 U/L (ref 12–78)
ANION GAP SERPL CALCULATED.4IONS-SCNC: 5 MMOL/L (ref 6–16)
AST SERPL W P-5'-P-CCNC: 17 U/L (ref 12–37)
BASOPHILS # BLD AUTO: 0.03 K/MM3 (ref 0–0.23)
BASOPHILS NFR BLD AUTO: 0 % (ref 0–2)
BILIRUB SERPL-MCNC: 0.2 MG/DL (ref 0.1–1)
BUN SERPL-MCNC: 12 MG/DL (ref 8–24)
CALCIUM SERPL-MCNC: 8.4 MG/DL (ref 8.5–10.1)
CHLORIDE SERPL-SCNC: 101 MMOL/L (ref 98–108)
CO2 SERPL-SCNC: 30 MMOL/L (ref 21–32)
CREAT SERPL-MCNC: 0.85 MG/DL (ref 0.6–1.2)
DEPRECATED RDW RBC AUTO: 47.3 FL (ref 35.1–46.3)
EOSINOPHIL # BLD AUTO: 0.8 K/MM3 (ref 0–0.68)
EOSINOPHIL NFR BLD AUTO: 12 % (ref 0–6)
ERYTHROCYTE [DISTWIDTH] IN BLOOD BY AUTOMATED COUNT: 13.4 % (ref 11.7–14.2)
GLOBULIN SER CALC-MCNC: 3.9 G/DL (ref 2.2–4)
GLUCOSE SERPL-MCNC: 90 MG/DL (ref 70–99)
HCT VFR BLD AUTO: 38 % (ref 37–53)
HGB BLD-MCNC: 12.6 G/DL (ref 13.5–17.5)
IMM GRANULOCYTES # BLD AUTO: 0 K/MM3 (ref 0–0.1)
IMM GRANULOCYTES NFR BLD AUTO: 0 % (ref 0–1)
LYMPHOCYTES # BLD AUTO: 2.06 K/MM3 (ref 0.84–5.2)
LYMPHOCYTES NFR BLD AUTO: 31 % (ref 21–46)
MCHC RBC AUTO-ENTMCNC: 33.2 G/DL (ref 31.5–36.5)
MCV RBC AUTO: 95 FL (ref 80–100)
MONOCYTES # BLD AUTO: 0.93 K/MM3 (ref 0.16–1.47)
MONOCYTES NFR BLD AUTO: 14 % (ref 4–13)
NEUTROPHILS # BLD AUTO: 2.86 K/MM3 (ref 1.96–9.15)
NEUTROPHILS NFR BLD AUTO: 43 % (ref 41–73)
NRBC # BLD AUTO: 0 K/MM3 (ref 0–0.02)
NRBC BLD AUTO-RTO: 0 /100 WBC (ref 0–0.2)
PLATELET # BLD AUTO: 177 K/MM3 (ref 150–400)
POTASSIUM SERPL-SCNC: 4.1 MMOL/L (ref 3.5–5.5)
PROT SERPL-MCNC: 6.9 G/DL (ref 6.4–8.2)
SODIUM SERPL-SCNC: 136 MMOL/L (ref 136–145)

## 2021-05-30 NOTE — NUR
SHIFT SUMMARY
PATIENT ARRIVED TO ROOM 339 VIA STRETCHER AT 0405. HE IS DEVELOPMENTALLY
DELAYED AND NONVERBAL. PT HAS DIFFICULTY FOLLOWING SIMPLE DIRECTIONS. HE IS
UNABLE TO WALK AND IS WHEELCHAIR BOUND AT BASELINE. PATIENT DOES NOT APPEAR TO
BE IN PAIN AND HAS EVEN BREATHS. IV PATENT AND INFUSING. BED IN LOWEST
POSITION WITH WHEELS LOCKED AND ALARM ON. CALL LIGHT WITHIN REACH. REPORT
GIVEN TO ONCOMING RN.

## 2021-05-30 NOTE — NUR
SHIFT SUMMARY
NO ACUTE CHANGES TO PRESENT THIS SHIFT. PT HAS SLEPT OFF AND ON ALL DAY.
NONVERBAL, DEVELOPMENTALLY DELAYED. PT LIVES AT [facility] FOR THE HANDICAP.
PT'S CAREGIVER HERE THIS AM TO CHECK ON PT. DR GUYTRATE ALSO HERE TO SEE PT. PT
ABLE TO RESUME HOME DIET AND MEDICATIONS, PER HOME SCHEDULE. PT TOLERATING
WELL. PER CAREGIVER, PT NORMALLY ON PUREE DIET WITH NECTAR THICK LIQUIDS VIA
SPOON. PT PASSED BEDSIDE SWALLOW EVAL AND ABLE TO EAT LUNCH W/O DIFFICULTY.
LABS WNL'S. CXR NOT SHOWING ANY CHANGES OR SIGNS OF PNM. PT TO D/C BACK TO
[facility] IN AM. CALL LT IN REACH. BED ALARM ON FOR SAFETY.

## 2021-05-31 LAB
ANION GAP SERPL CALCULATED.4IONS-SCNC: 4 MMOL/L (ref 6–16)
BASOPHILS # BLD AUTO: 0.03 K/MM3 (ref 0–0.23)
BASOPHILS NFR BLD AUTO: 0 % (ref 0–2)
BUN SERPL-MCNC: 13 MG/DL (ref 8–24)
CALCIUM SERPL-MCNC: 8.5 MG/DL (ref 8.5–10.1)
CHLORIDE SERPL-SCNC: 104 MMOL/L (ref 98–108)
CO2 SERPL-SCNC: 28 MMOL/L (ref 21–32)
CREAT SERPL-MCNC: 0.83 MG/DL (ref 0.6–1.2)
DEPRECATED RDW RBC AUTO: 47.4 FL (ref 35.1–46.3)
EOSINOPHIL # BLD AUTO: 0.53 K/MM3 (ref 0–0.68)
EOSINOPHIL NFR BLD AUTO: 7 % (ref 0–6)
ERYTHROCYTE [DISTWIDTH] IN BLOOD BY AUTOMATED COUNT: 13.6 % (ref 11.7–14.2)
GLUCOSE SERPL-MCNC: 112 MG/DL (ref 70–99)
HCT VFR BLD AUTO: 35.2 % (ref 37–53)
HGB BLD-MCNC: 11.9 G/DL (ref 13.5–17.5)
IMM GRANULOCYTES # BLD AUTO: 0.02 K/MM3 (ref 0–0.1)
IMM GRANULOCYTES NFR BLD AUTO: 0 % (ref 0–1)
LYMPHOCYTES # BLD AUTO: 1.76 K/MM3 (ref 0.84–5.2)
LYMPHOCYTES NFR BLD AUTO: 24 % (ref 21–46)
MAGNESIUM SERPL-MCNC: 2.4 MG/DL (ref 1.6–2.4)
MCHC RBC AUTO-ENTMCNC: 33.8 G/DL (ref 31.5–36.5)
MCV RBC AUTO: 95 FL (ref 80–100)
MONOCYTES # BLD AUTO: 1.1 K/MM3 (ref 0.16–1.47)
MONOCYTES NFR BLD AUTO: 15 % (ref 4–13)
NEUTROPHILS # BLD AUTO: 3.81 K/MM3 (ref 1.96–9.15)
NEUTROPHILS NFR BLD AUTO: 53 % (ref 41–73)
NRBC # BLD AUTO: 0 K/MM3 (ref 0–0.02)
NRBC BLD AUTO-RTO: 0 /100 WBC (ref 0–0.2)
PLATELET # BLD AUTO: 224 K/MM3 (ref 150–400)
POTASSIUM SERPL-SCNC: 3.9 MMOL/L (ref 3.5–5.5)
SODIUM SERPL-SCNC: 136 MMOL/L (ref 136–145)

## 2021-05-31 NOTE — NUR
DISCHARGE NOTE
PATIENT DISCHARGED TO HOME AT [facility]. PATIENT'S CAREGIVER IN THE ROOM FOR MUCH OF
THIS SHIFT. PATIENT ALERT TO SELF, WHICH IS PATIENT'S BASELINE. PATIENT
COOPERATIVE WITH CARE. IV REMOVED PRIOR TO DISCHARGE. DISCHARGE INSTRUCTIONS
EXPLAINED TO CAREGIVER. PATIENT TO VEHICLE VIA WHEELCHAIR BY CAREGIVER.

## 2021-05-31 NOTE — NUR
SHIFT SUMMARY
PATIENT HAD NO ACUTE CHANGES OBSERVED. DEVELOPMENTALLY DELAYED AND W/C BOUND.
NON-VERBAL AND BEDREST.  TAKES MEDICATION CRUSHED IN APPLESAUCE. PIV REMAINS
INTACT. NO S/SX OF PAIN, SOB, AND N/V. AFEBRILE. POSSIBLE DISCHARGE TODAY BACK
TO [facility] FOR HANDICAPPED. BED IN LOWEST POSITION AND ALARM ACTIVATED.
WILL CONTINUE TO MONITOR UNTIL DAY SHIFT NURSE ASSUMES CARE.

## 2021-11-10 ENCOUNTER — HOSPITAL ENCOUNTER (OUTPATIENT)
Dept: HOSPITAL 95 - ORSCSDS | Age: 76
Discharge: HOME | End: 2021-11-10
Attending: INTERNAL MEDICINE
Payer: COMMERCIAL

## 2021-11-10 VITALS — HEIGHT: 60 IN | BODY MASS INDEX: 35.53 KG/M2 | WEIGHT: 181 LBS

## 2021-11-10 DIAGNOSIS — E66.9: ICD-10-CM

## 2021-11-10 DIAGNOSIS — E11.9: ICD-10-CM

## 2021-11-10 DIAGNOSIS — I10: ICD-10-CM

## 2021-11-10 DIAGNOSIS — I50.9: ICD-10-CM

## 2021-11-10 DIAGNOSIS — D12.2: ICD-10-CM

## 2021-11-10 DIAGNOSIS — F41.9: ICD-10-CM

## 2021-11-10 DIAGNOSIS — E78.5: ICD-10-CM

## 2021-11-10 DIAGNOSIS — Z79.82: ICD-10-CM

## 2021-11-10 DIAGNOSIS — K59.00: Primary | ICD-10-CM

## 2021-11-10 DIAGNOSIS — K63.89: ICD-10-CM

## 2021-11-10 DIAGNOSIS — Z79.899: ICD-10-CM

## 2021-11-10 DIAGNOSIS — Z79.84: ICD-10-CM

## 2021-11-10 DIAGNOSIS — D12.3: ICD-10-CM

## 2021-11-10 NOTE — NUR
11/10/21 1052 HANY AGUILAR
2 ATTEMPTS AT IV BY RN. FIRST ATTEMPT IN R HAND
PATIENT PULLED BACK, BUT SECOND
ATTEMPT R HAND RN WAS SUCCESSFUL

## 2021-11-10 NOTE — NUR
11/10/21 1230 Andreina Mak
PT FROM ENDO 2 S/P COLONOSCOPY TO PAR. ON MONITOR, ASLEEP, SATS 100%
on 15 LITERS O2. 1220 INTO PACU.

## 2022-04-19 ENCOUNTER — HOSPITAL ENCOUNTER (INPATIENT)
Dept: HOSPITAL 95 - ER | Age: 77
LOS: 3 days | DRG: 871 | End: 2022-04-22
Attending: INTERNAL MEDICINE | Admitting: INTERNAL MEDICINE
Payer: COMMERCIAL

## 2022-04-19 VITALS — WEIGHT: 201.28 LBS | HEIGHT: 60 IN | BODY MASS INDEX: 39.52 KG/M2

## 2022-04-19 DIAGNOSIS — Z66: ICD-10-CM

## 2022-04-19 DIAGNOSIS — F03.90: ICD-10-CM

## 2022-04-19 DIAGNOSIS — F41.9: ICD-10-CM

## 2022-04-19 DIAGNOSIS — R62.50: ICD-10-CM

## 2022-04-19 DIAGNOSIS — E78.5: ICD-10-CM

## 2022-04-19 DIAGNOSIS — Z20.822: ICD-10-CM

## 2022-04-19 DIAGNOSIS — R65.20: ICD-10-CM

## 2022-04-19 DIAGNOSIS — J18.9: ICD-10-CM

## 2022-04-19 DIAGNOSIS — Z51.5: ICD-10-CM

## 2022-04-19 DIAGNOSIS — G40.909: ICD-10-CM

## 2022-04-19 DIAGNOSIS — G93.41: ICD-10-CM

## 2022-04-19 DIAGNOSIS — J69.0: ICD-10-CM

## 2022-04-19 DIAGNOSIS — Z79.84: ICD-10-CM

## 2022-04-19 DIAGNOSIS — Z79.899: ICD-10-CM

## 2022-04-19 DIAGNOSIS — E87.2: ICD-10-CM

## 2022-04-19 DIAGNOSIS — Z98.890: ICD-10-CM

## 2022-04-19 DIAGNOSIS — I35.1: ICD-10-CM

## 2022-04-19 DIAGNOSIS — K59.09: ICD-10-CM

## 2022-04-19 DIAGNOSIS — J96.01: ICD-10-CM

## 2022-04-19 DIAGNOSIS — Z79.82: ICD-10-CM

## 2022-04-19 DIAGNOSIS — I50.32: ICD-10-CM

## 2022-04-19 DIAGNOSIS — A41.9: Primary | ICD-10-CM

## 2022-04-19 DIAGNOSIS — I11.0: ICD-10-CM

## 2022-04-19 LAB
ALBUMIN SERPL BCP-MCNC: 3 G/DL (ref 3.4–5)
ALBUMIN/GLOB SERPL: 0.7 {RATIO} (ref 0.8–1.8)
ALT SERPL W P-5'-P-CCNC: 19 U/L (ref 12–78)
ANION GAP SERPL CALCULATED.4IONS-SCNC: 7 MMOL/L (ref 6–16)
AST SERPL W P-5'-P-CCNC: 18 U/L (ref 12–37)
BASOPHILS # BLD AUTO: 0.03 K/MM3 (ref 0–0.23)
BASOPHILS NFR BLD AUTO: 0 % (ref 0–2)
BILIRUB SERPL-MCNC: 0.2 MG/DL (ref 0.1–1)
BUN SERPL-MCNC: 9 MG/DL (ref 8–24)
CALCIUM SERPL-MCNC: 9.1 MG/DL (ref 8.5–10.1)
CHLORIDE SERPL-SCNC: 97 MMOL/L (ref 98–108)
CO2 SERPL-SCNC: 29 MMOL/L (ref 21–32)
CREAT SERPL-MCNC: 0.89 MG/DL (ref 0.6–1.2)
DEPRECATED RDW RBC AUTO: 42.6 FL (ref 35.1–46.3)
EOSINOPHIL # BLD AUTO: 0.23 K/MM3 (ref 0–0.68)
EOSINOPHIL NFR BLD AUTO: 2 % (ref 0–6)
ERYTHROCYTE [DISTWIDTH] IN BLOOD BY AUTOMATED COUNT: 12.3 % (ref 11.7–14.2)
FLUAV RNA SPEC QL NAA+PROBE: NEGATIVE
FLUBV RNA SPEC QL NAA+PROBE: NEGATIVE
GLOBULIN SER CALC-MCNC: 4.3 G/DL (ref 2.2–4)
GLUCOSE SERPL-MCNC: 180 MG/DL (ref 70–99)
HCT VFR BLD AUTO: 36.6 % (ref 37–53)
HGB BLD-MCNC: 12.4 G/DL (ref 13.5–17.5)
IMM GRANULOCYTES # BLD AUTO: 0.03 K/MM3 (ref 0–0.1)
IMM GRANULOCYTES NFR BLD AUTO: 0 % (ref 0–1)
KETONES UR STRIP-MCNC: (no result) MG/DL
LYMPHOCYTES # BLD AUTO: 1.56 K/MM3 (ref 0.84–5.2)
LYMPHOCYTES NFR BLD AUTO: 15 % (ref 21–46)
MCHC RBC AUTO-ENTMCNC: 33.9 G/DL (ref 31.5–36.5)
MCV RBC AUTO: 95 FL (ref 80–100)
MONOCYTES # BLD AUTO: 1.38 K/MM3 (ref 0.16–1.47)
MONOCYTES NFR BLD AUTO: 13 % (ref 4–13)
NEUTROPHILS # BLD AUTO: 7.39 K/MM3 (ref 1.96–9.15)
NEUTROPHILS NFR BLD AUTO: 70 % (ref 41–73)
NRBC # BLD AUTO: 0 K/MM3 (ref 0–0.02)
NRBC BLD AUTO-RTO: 0 /100 WBC (ref 0–0.2)
PH BLDV: 7.38 [PH] (ref 7.34–7.37)
PLATELET # BLD AUTO: 220 K/MM3 (ref 150–400)
POTASSIUM SERPL-SCNC: 3.9 MMOL/L (ref 3.5–5.5)
PROT SERPL-MCNC: 7.3 G/DL (ref 6.4–8.2)
RBC #/AREA URNS HPF: (no result) /HPF (ref 0–2)
RSV RNA SPEC QL NAA+PROBE: NEGATIVE
SARS-COV-2 RNA RESP QL NAA+PROBE: NEGATIVE
SODIUM SERPL-SCNC: 133 MMOL/L (ref 136–145)
SP GR SPEC: 1.01 (ref 1–1.02)
UROBILINOGEN UR STRIP-MCNC: (no result) MG/DL
WBC #/AREA URNS HPF: (no result) /HPF (ref 0–5)

## 2022-04-19 PROCEDURE — A9270 NON-COVERED ITEM OR SERVICE: HCPCS

## 2022-04-19 NOTE — NUR
TELEPHONE CALL TO HOSPITALIST RE PT STRICT NPO, NO ORAL MEDS.  SAYS HE WILL
REVIEW MEDS AND MAKE CHANGES.  ALSO APPROVED 250 ML NS KVO, TO INFUSE ABX
WHICH ARE A SECONDARY SYRINGE.

## 2022-04-19 NOTE — NUR
SHIFT SUMMARY
PT DEVELOPMENTALLY DELAYED/NONVERBAL, PLEASANT/COOPERATIVE WITH CARE, SLEEPY
TODAY, CAREGIVER AT BEDSIDE (THEY WILL BE DURING DAYSHIFT DAILY). PT IS NPO,
HAD A SPEECH EVAL, PLAN FOR BARIUM SWALLOW TOMORROW. ABX INFUSING PER EMAR, NS
KVO/SL, IV LFA. BEDREST/BEDPAN AND URINAL. 2LNC/BIOX BEDSIDE. WILL REPORT TO
ONCOMING NOC RN.

## 2022-04-20 LAB
ANION GAP SERPL CALCULATED.4IONS-SCNC: 6 MMOL/L (ref 6–16)
BASOPHILS # BLD AUTO: 0.02 K/MM3 (ref 0–0.23)
BASOPHILS NFR BLD AUTO: 0 % (ref 0–2)
BUN SERPL-MCNC: 15 MG/DL (ref 8–24)
CALCIUM SERPL-MCNC: 8.6 MG/DL (ref 8.5–10.1)
CHLORIDE SERPL-SCNC: 100 MMOL/L (ref 98–108)
CO2 SERPL-SCNC: 29 MMOL/L (ref 21–32)
CREAT SERPL-MCNC: 1.03 MG/DL (ref 0.6–1.2)
DEPRECATED RDW RBC AUTO: 45.3 FL (ref 35.1–46.3)
EOSINOPHIL # BLD AUTO: 0.01 K/MM3 (ref 0–0.68)
EOSINOPHIL NFR BLD AUTO: 0 % (ref 0–6)
ERYTHROCYTE [DISTWIDTH] IN BLOOD BY AUTOMATED COUNT: 12.8 % (ref 11.7–14.2)
GLUCOSE SERPL-MCNC: 148 MG/DL (ref 70–99)
HCT VFR BLD AUTO: 32.2 % (ref 37–53)
HGB BLD-MCNC: 10.6 G/DL (ref 13.5–17.5)
IMM GRANULOCYTES # BLD AUTO: 0.03 K/MM3 (ref 0–0.1)
IMM GRANULOCYTES NFR BLD AUTO: 0 % (ref 0–1)
LYMPHOCYTES # BLD AUTO: 0.82 K/MM3 (ref 0.84–5.2)
LYMPHOCYTES NFR BLD AUTO: 10 % (ref 21–46)
MCHC RBC AUTO-ENTMCNC: 32.9 G/DL (ref 31.5–36.5)
MCV RBC AUTO: 97 FL (ref 80–100)
MONOCYTES # BLD AUTO: 1.13 K/MM3 (ref 0.16–1.47)
MONOCYTES NFR BLD AUTO: 14 % (ref 4–13)
NEUTROPHILS # BLD AUTO: 6.12 K/MM3 (ref 1.96–9.15)
NEUTROPHILS NFR BLD AUTO: 75 % (ref 41–73)
NRBC # BLD AUTO: 0 K/MM3 (ref 0–0.02)
NRBC BLD AUTO-RTO: 0 /100 WBC (ref 0–0.2)
PLATELET # BLD AUTO: 188 K/MM3 (ref 150–400)
POTASSIUM SERPL-SCNC: 3.8 MMOL/L (ref 3.5–5.5)
SODIUM SERPL-SCNC: 135 MMOL/L (ref 136–145)

## 2022-04-20 NOTE — NUR
CALLED DR BROOKS-
PT HAS A TEMP 101.3. ONLY PO TYLENOL ORDERED, ALL MEDICATIONS ORDERED PO, PT
IS A STRICT NPO FOR ASPIRATION PNEUMONIA. HR RUNNING 124 ON MORNING VITALS,
SBP LESS THAN 100. SPOKE TO  ABOUT TYLENOL TN, AND POSSITIVE BLOOD CULTURES.
SHE IS PLACING ORDERS FOR TN TYLENOL AND A FLUID BOLUS AT THIS TIME, SHE WILL
REVIEVW ABX.

## 2022-04-20 NOTE — NUR
SHIFT SUMMARY-
PT DEVELOPMENTALY DELAYED AND NON-VERBAL. HE IS PLEASANT AND COOPERATIVE. PT
IS STRICT NPO STATUS DUE TO ASPIRATION RISK. BARIUM STUDY WAS SCHEDULED FOR
TODAY BUT IS DELAYED UNTIL TOMORROW, PT WAS TOO TIRED AND UNRESPONSIVE THIS AM
FOR THE STUDY.  IN THE AM PT TEMP .3, BP 98/48. HE WAS GIVEN PRN TYENOL
SUPPOSITORY AND A 1000 ML BOLUS OF NS. AT 1150 HIS BP /76 AND TEMP WAS
99.  DURING HIS BOLUS HIS IV STARTED TO LEAK, A NEW IV WAS STARTED NEAR HIS R
WRIST.  PT WAS STARTED ON IV VANCOMYACIN. PT BRNEDEN'T VOIDED FOR OVER 6 HOURS,
A BLADDER SCAN WAS DONE AROUND 1700  ML WAS MEASURED. Q6 BLADDER SCAN
ORDER IS IN PLACE WITH ORDER TO STRAIGHT CATH IF OVER 350 ML. PT WAS STARTED
ON IV FLUIDS, 100 ML/HR FOR 2000 ML. NO S/S OF DISTRESS T/O SHIFT. WILL PASS
INFORMATION TO NIGHT NURSE IN REPORT.

## 2022-04-20 NOTE — NUR
SHIFT SUMMARY
76 YR M ADMITTED ON 4/19/22 FOR ASPIRATION PNEUMONIA. DNR. PT IS
DEVELOPMENTALLY DELAYED,
NONVERBAL AND
HAS A CAREGIVER PRESENT. HE HAS A VERY WET, CROUPY COUGH AND HAD A DEEP
SUCTION TX FROM RT THIS SHIFT. HE HAS ALSO HAD A BREATHING TX AS IT HAS BEEN
DIFFICULT FOR HIM TO COUGH UP THE WETNESS IN HIS LUNGS. HE IS STRICT NPO AT
THIS TIME AS HE IS AT SERIOUS RISK FOR ASPIRATION. A BARIUM SWALLOW IS
SCHEDULED FOR THIS MORNING.

## 2022-04-21 LAB
KETONES UR STRIP-MCNC: (no result) MG/DL
PROT UR STRIP-MCNC: (no result) MG/DL
RBC #/AREA URNS HPF: (no result) /HPF (ref 0–2)
SP GR SPEC: 1.01 (ref 1–1.02)
URN SPEC COLLECT METH UR: (no result)
UROBILINOGEN UR STRIP-MCNC: (no result) MG/DL
WBC #/AREA URNS HPF: (no result) /HPF (ref 0–5)

## 2022-04-21 NOTE — NUR
COMFORT CARE NOTE-
PT SEEMS TIGHT AND TENSE, PER CAREGIVER PT LOOKS LIKE HE DOES WHEN HE GETS
ANXIOUS. RESP RATE STILL ELEVATED SHOULDERS SHRUGGED TIGHT ACCESSORY MUSCLE
USE SHOWS AIR HUNGER, MEDICATED WITH 5MG ROXANOL AND 1MG ATIVAN CRUSHED IN IT
SL. WILL REASSESS SHORTLY FOR EFFECTIVENESS.

## 2022-04-21 NOTE — NUR
This pt is a 76 y.o. man with severe sepsis with acute organ disfunction
related to either a lung or UTI infection. He is exhibiting fever,
tachycardia, tachypnea and positive blood cultures.
 
   The pt is currently on oxygen and non-verbal. He failed last modified
barium swallow study.  He is currently NPO. Pt's current status is DNR, and
after discussion with health team, pt's status was changed to comfort care,
and discussions are underway to take pt home with hospice.  He has continued
to have noisy, moist breathing and coughs very easily.
 
   CG's have remained at the bed side with pt. No changes to pt's care plan at
this time.

## 2022-04-21 NOTE — NUR
SHIFT SUMMARY-
PT UNRESPONSIVE, PLACED ON COMFORT CARE THIS MORNINNG. PT HAS BEEN DISPLAYING
AIR HUNGER T/O THE SHIFT, FREQUENT MEDICATION SEEMED TO HELP VERY LITTLE,
ATIVAN HELPED THE PT RELAX. (SEE PREVIOUS NOTES FOR DETAILS) PER PALLIATIVE
CARE RN PT SHOULD BE RECIEVING HOURLY ROXANOL AND Q4 ATIVAN REGULARLY. PASSED
THIS ON TO NIGHT RN IN BEDSIDE REPORT. PT HAS MULTIPLE CAREGIVERS AT THE
BEDSIDE WITH HIM AT THIS TIME. JUST PRIOR TO SHIFT CHANGE THE PT BEGAN TO
GURGLE WITH SECRETIONS, DID ORAL SUCTION WITH A LITTLE IMPROVEMENT, THEN
ADMINISTERED ATROPIENE DROPS. AFTER REPORT CALLED RT TO REATEMPT SUCTION IF
POSSIBLE. IT SOUNDS LIKE BACK OF THE THROAT RATHER THAN A DEEPER RATTLE. NIGHT
RN AWARE. numerical [id number]

## 2022-04-21 NOTE — NUR
SHIFT SUMMARY
PATIENT HAD LOW GRADE TEMP 99.6 AND ON 3L O2 NC. NPO AND ON CONTINUOUS PULSE
OXIMETRY WITH HR 'S. NS INFUSING  mL/HR, ONE OF TWO. IV ABXS
INFUSED. CAREGIVER IN ROOM UNTIL 21:00 AND BACK AT 02:30. AXOX 2
DEVELOPMENTALLY DELAY AND NON-VERBAL. SUCTIONED T/O SHIFT. ABLE TO SLEEP
SECOND HALF OF SHIFT. CALL LIGHT IN REACH. BED IN LOWEST POSITION. WILL
CONTINUE TO MONITOR UNTIL DAY SHIFT NURSE ASSUMES CARE.

## 2022-04-21 NOTE — NUR
COMFORT CARE NOTE-
CAREGIVER CALLED FOR STAFF ASSISTANCE SHORTLY AFTER CARE ROUNDING. PT WAS IN
VISIBLE RESPIRATORY DISTRESS, CHEYNNE SSTOKES RESP NOTED. SLIGHT AUDIBLE
SECRETIONS, BLADDER SCAN SHOWED 500+ML FLEMING CATH PLACED FOR COMFORT, RN GABE PERRY ASSISTED NURSING STUDENT AND BEDSIDE RN IN PLACING THE FLEMING, SAMPLE SENT TO
THE LAB PER UNIT PROTOCOL. 500ML OUT OF FLEMING AT THE TIME OF PLACEMENT. PT
MEDICATED WITH 1MG IV ATIVAN AS EXTREMITIES WERE RIGID AND PT SEEMED VERY
DISTRESSED. 20MG SL ROXANOL GGIVEN AS WELL FOR AIR HUNGER. PER PALLIATIVE CARE
PT IS TRANSITIONING AND SHOULD BE MEDICATED HOURLY. WILL CTM.

## 2022-04-21 NOTE — NUR
FLEMING CATHETER INSERTED-
DURING A COMFORT CARE ASSESSMENT WE NOTICE THE PT HAD NOT VOIDED IN OVER 15
HOURS. I PERFORMED A BLADDER SCAN AND MEASURED 551 ML. I INSERTED A FLEMING
CATHETER WITH THE HELP OF GABE PERRY RN AND GABE CHAVES RN, A STERILE FIELD WAS
MAINTANED. A STERILE URINE SAMPLE WAS IMMEDIATLY COLLECTED AND SENT TO THE
LAB.

## 2022-04-21 NOTE — NUR
COMFORT CARE NOTE-
PT STILL SEEMS IN DISTRESS WITH HIS BREATHING. CALLED PALLIATIVE CARE TO COME
AND ASSESS TO HELP PROVIDE SUGGESTIONS FOR SYMPTOM MANAGEMENT. AFTER
PALLIATIVE CARE RN CAME TO SEE THE PT HE WAS MEDICATED WITH 10MG SL ROXANOL.
WILL CTM, AND WORK WITH PALLIATIVE CARE TO GET THE PT COMFORTABLE. PT IS BEING
REPOSITIONED Q2 LAST TURN AT 1200.

## 2022-04-21 NOTE — NUR
CALLED RT TO SEE THE OT-
PT SEEMS TO BE WORKING HARD TO BREATHE WITH ACCESSORY MUSCLE USE. CALLED RT
ABOUT A BREATHING Tx TO SEE IF THAT WOULD HELP, THERE SEEMS TO BE NO CHANGE IN
RESP RATE OR WORK OF BREATHING. DR RAMÍREZ ROUNDED ON THE PT AND AGREED THAT
MAYBE A SLIGHTLY LARGER DOSE OF ROXANOL MAY BE WARANTED, CHARGE SALTY CARBALLO
ALSO SUGGESTED THIS. WILL MEDICATE WITH 15MG SL ROXANOL AT THIS TIME.

## 2022-04-21 NOTE — NUR
COMFORT CARE NOTE-
5MG ROXANOL HAD NO EFFECT, MEDICATED WITH AN ADDITIONAL 5MG, PT SPIT OUT A
LITTLE OF IT. PT WAS REPOSITIONED PRIOR TO THE ADDITIONAL MEDICATION DOSE.
CAREGIVERS AT THE BEDSIDE AND AGREE THE PT NEEDS MORE RELIEF.

## 2022-04-21 NOTE — NUR
COMFORT CARE-
ALL MDS AND PT CAREGIVERS MET WITH SPEECH THERAPY, PT WAS CHANGED TO COMFORT
CARE. SPOKE TO DR RAMÍREZ ABOUT THHE PT RESPIRATION RATE AND WORK OF BREATHING,
AND THE USE OF ROXANOL FOR COMFORT TO HELP WITH AIR HUNGER, HE IS AWARE THAT
THE PT COULD USE COMFORT CARE ROXANOL AT THIS TIME. MEDICATED WITH 5MG SL
ROXANOL TO HELP WITH AIR HUNGER. CAREGIVER PRESENT AND AWARE. ALSO PLACED A
SCOPOLOMINE PATCH TO HELP REDUCE THE SECRETIONS AS THE PT IS HAVING TROUBLE
CLEARING THEM.

## 2022-04-21 NOTE — NUR
COMFORT CARE NOTE-
PT WAS MEDICATED WITH TYLENOL FOR A FEVER ICE PACKS PLACED TO HELP REDUCE BODY
TEMP. PT SEEMED TO TOLLERATE THOSE WELL. RECHECK SHOWED TEMP TO BE REDUCED.
1MG IV ATIVAN WAS GIVEN TO PREVENT SEIZURE ACTIVITY, PT WAS NOTABRIGID AT THE
TIME OF ADMINISTRATION. AT THIS TIME THE PT SEEMS MORE RELAXED BUT BREATHING
IS STILL EXTREMELY LABORED WILL MEDICATE WITH AN ADDITIONAL 10MG ROXANOL NOW
AND CTM. PALLIATIVE CARE NURSE AGREES THIS PT WILL BENNIFIT FROM REGULAR
MEDICATION WITH ROXANOL AND ATIVAN.

## 2022-04-21 NOTE — NUR
SPOKE WITH PALLIATIVE CARE ABOUT SYMPTOM MANAGEMNT-
PT RESP RATE IS STILL ELEVATED AND HE STILL SEEMS TO BE WORKING HARD TO
BREATHE. SPOKE ABOUT THE USE OF ATIVAN IN CONJUNCTION WITH THE ROXANOL AT THE
NEXT DOSING TO HELP THE PT TO RELAX AND BREATHE EASY. PT CAREGIVERS ALSO
STATED THEY HAVE NOTICED SOME TWITCHING AND ARE A LITTLE CONCERNED ABOUT THE
PT NOT HAVING SEIZURE MEDS, ATIVAN WILL HELP WITH THAT AS WELL. PALLIATIVE
CARE AGREES THIS IS A GOOD NEXT STEP.

## 2022-04-21 NOTE — NUR
Spiritual Care visit.
At the request of Palliative Care. Pt. is not responsive and is displaying
evidence of a deep wet chest rattle. Two care-givers are present. Facilitated
a life review and gave pastoral care. 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
while mudno has not been a part of the Pts. life we honored him a pastoral
blessing.  Caregivings both verbalized gratitude for the spiritual care visit.

## 2022-04-22 NOTE — NUR
Pt is actively dying; he has been transitioning most of the day, and has
required increased doses of roxanol. His IV occluded, and he has no other
access, so PO lorazepam is being used instead of IV. It is effective. The
patient has had noisy, moist breathing today. RT has provided deep suction,
and bedside RN has continued to medicate as needed.

## 2022-04-22 NOTE — NUR
COMFORT CARE NOTE-
MEDICATED WITH REGULAR ROXINOL. PT SEEMS RELAXED AT THIS TIME, MODELING STILL
NOTED IN EXTREMITIES. PT IS HAVING SOME PAUSES IN RESPIRATIONS NOTED
OCCASSIONALLY. CAREGIVER AT BEDSIDE. [facility] HAS BEEN SELECTED FOR
WHEN THE PT PASSES.

## 2022-04-22 NOTE — NUR
COMFORT CARE NOTE-
PT CONTINUES TO DECLINE AND CAREGIVERS AT THE BEDSIDE REQUESTED A LITTLE ORAL
SUCTION AGAIN, PT TOLLERATED IT WELL. ONCE COMPLETED ORAL ROXANOL WAS GIVEN
TOO SOON FOR NEXT ATIVAN DOSE, WILL MEDICATE WITH ATIVAN AT NEXT ROXINOL DOSE.
PT DOES NOT APPEAR DISTRESSED AT THIS TIME. FINGERS MOTTLED AS WELL AS TOES.
WILL CTM AND MEDICATE REGULARLY.

## 2022-04-22 NOTE — NUR
comfort care note/ assumed care of pt note-
Pt caregiver at bedside, work of breathing is still elevated as is respiration
rate. Medicated with roxinol and ativan, as pt has Hx of seizures, to prevent
seizures as pt has not had any since last night. Caregiver called the house
manager who is helping to make decisions for MurtazaDr Winters at the bedside
and instructed staff to remove the O2 at this time, RT at the bedside at this
time as well. Pt seems comfortable repositioned to left hilda lying to help with
breathing that is still a little labored. Pt seemed to choke on his toung at
that time, staff will reduce frequency of repositioning at this time. Pt is
eminent, caregiver staff of his facility are aware and some are coming to be
with the pt.

## 2022-04-22 NOTE — NUR
COMFORT CARE NOTE-
PT BEING MEDICATED FOR AIR HUNGER REGULARLY AT 1 HOUR INTERVALS, MEDICATED AT
CHANGE OF SHIFT WITH ROXANOL AND ATIVAN TOGETHER ATIVAN HAS BEEN MAINTAINED Q4
HOURS TO PREVENT SEIZURE ACTIVITY. PT HAD NOT RECIEVED A DOSE FOR THE PAST 6
HOURS. CAREGIVER AT BEDSIDE AND CALLS WHEN MEDICATIONS ARE NEEDED (HOURLY) IF
STAFF HAVENT ARRIVED YET WITH THEM. BEDSIDE REPORT COMPLETED AT THIS TIME.
SUCTION ORAL CARE PERFORMED (PRIOR TO MED ADMINISTRATION) AS PT HAS BEEN
FOAMING A LITTLE AT THE MOUTH.

## 2022-04-22 NOTE — NUR
COMFORT CARE NOTE-
PT HAS HAD ANOTHER CHANGE, SOME GASPING RESPIRATIONS NOTED, MEDICATED WITH SL
ROXINOL FOR AIR HUNGER, PPT HAS A CAREGIVER AT THE BEDSIDE, SOME BLUING NOTED
ON THE PT CHEEKS, TOES ARE PALE BUT NO MODELING NOTED AT THIS TIME WILL CTM.

## 2022-04-22 NOTE — NUR
COMFORT CARE NOTE-
ORAL SUCTION PERFORMED ON PT FOR SECRETION CLEARING. SL MEDICATIONS
ADMINISTERED, PT COOL TO TOUCH STARTING TO SHOW SOME SIGNS OF MODELING ON
FINGER TIPS, RESPIRATIONS SHALLOW AND SEEM TO BE MORE RELAXED AT THIS TIME,
WILL CTM AND MEDICATE AS PLANNED.

## 2022-04-22 NOTE — NUR
PT PASSED AT 1945-
CAREGIVER CAME TO GET THIS RN AS SHE THOUGHT THE PT HAD PASSED, SHE SEEMED A
LITTLE AFRAID TO BE ALONE WITH THE PT WHEN HE PASSES. ENTERED THE ROOM AND THE
PT WAS BREATHING AGAIN, CLEANED UP HIS MOUTH AS HE HAD SPIT UP MORE FOAM.
STOOD WITH THE PT FOR A COUPLE OF MINUTES AS HIS BREATHING HAD CHANGED AND HE
SEEMED TO BE IN HIS FINAL MOMENTS. PT STOPPED BREATHING AT 1944 AND HEART
SOUNDS WERE NO LONGER AUDIBLE ON AUSCULTATION AT 1945. TOD 1945. CHARGE RN
MELONIE NOTIFIED AS WELL AS THE NURSING SUPERVISOR. ONE CAREGIVER WAS MINUTES
AWAY AT THE TIME OF PASSING PASTORAL CARE WAS REQUESTED TO COME IN FOR HIM AND
THE PT, TO SAY A PRAYER.

## 2022-04-22 NOTE — NUR
COMFORT CARE NOTE-
PT SUCTIONED AND MEDICATED AGAIN, RESPERATIONS HAVE CHANGED AGAIN AS THE PT
CONTINUES TO TRANSITION. MEDS GIVEN FOR AIR HUNGER AND TO MAINTAIN COMFORT.
PALLIATIVE CARE HAD RECOMENDED THE USE OF NM PHENEGREN TO HELP THE ATIVAN WORK
BETTER, HOWEVER THE CAREGIVERS AT THE BEDSIDE WOULD LIKE IT IF STAFF DID NOT
DO THAT AT THIS TIME, AS PT BREATHING HAS AGAIN CHANGED THIS DOES NOT SEEM
LIKE A NEEDED TREATMENT AT THIS TIME. PALLIATIVE CARE RN NOTIFIED AND AGREED.

## 2022-04-22 NOTE — NUR
COMFORT CARE NOTE-
PT HAS A CAREGIVER AT THE BEDSIDE WHO CAME TO STAFF WITH A CONCERN THAT THE PT
WAS HAVING MORE DISTRESS WITHOUT THE O2 IN PLACE, HE STATED THE PT WAS HAVING
MORE JERKY MOVEMENTS AND SEEMED MORE DISTRESSED. MEDICATED WITH AN ADDITIONAL
10MG ROXANOL AT THIS TIME AND PLACED NC BACK ON THE PT FOR COMFORT AT 2L AT
THIS TIME, PER PALLIATIVE CARE RN SUGGESTION. WILL MEDICATE PT WITH 20MG
RROXANOL HOURLY AND REDUCE THE O2 IF PT REMAINS COMFORTABLE.

## 2022-04-22 NOTE — NUR
COMFORT CARE NOTE-
PT APPEARS COMFORTABLE AT THIS TIME, CAREGIVERS AGREE THAT STAYING ON TOP OF
THE ROXINOL IS BEST AS THE PT HAS STRUGGLED SO MUCH WITH THE AIR HUNGER,
PALLIATIVE CARE CAME TO SEE THE PT AND AGREED MEDICATING HOURLY WITH THIS DOSE
OF ROXANOL IS ADVISABLE. MODELING STILL NOTED ON TOES AND FINGERS. PT WAS
MEDICATED BETWEEEN THE ROXANOL DOSES WITH SL ATROPIENE DROPS TO TRY TO DRY UP
SECRETIONS.

## 2022-04-22 NOTE — NUR
IV REMOVAL-
DURING A FLUSH BEFORE MEDICATION ADMINISTRATION IT WAS NOTICED BY PT'S
CAREGIVER THAT THE IV WAS LEAKING. IV WAS REMOVED, REMOVAL WAS WNL.

## 2022-04-22 NOTE — NUR
CCOMFORT CARE NOTE-
PT MEDICATED WITH SL ROXINOL WITH 1MG CRUSHED ATIVAN MIXED IN. PT BODY STILL A
LITTLE TENSE FROM THE PRIOR EPISODE, O2 REMOVED ENTIRELY AT THIS TIME. WILL
CTM FOR S&S OF DISTRESS, CAREGIVER STILL AT THE BEDSIDE. SECRETIONS SEEM WELL
MANAGED AT THIS TIME.
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
## 2022-04-22 NOTE — NUR
Spiritual Care.
Follow up visit with Pt. and Caregiver. Pt. is not responsive and still
displaying evidence of rattle in his breathing. Caregiver has been at bedside
much of the previous day, and since 0700 this morning. Gave pastoral
encouragement.  Caregiver verbalized gratitude for the spiritual care visit.

## 2022-04-22 NOTE — NUR
Callback - EOL
Received call that Pt. had passed, caregivers were present, and requested a
.  One caregiver remained and welcomed my visit.  Re-established
rapport while nurses prepared the body.
 
When nurses left, scriptures were read, and prayer was given. Murtaza's
life was honored. Pastoral  was given. Caregiver verbalized gratitude
for the care of the staff and for the spiritual care visit.
 
[facility] is the chosen  home. Confirmed with nurses station.

## 2022-04-22 NOTE — NUR
COMFORT CARE NOTE-
PT SECRETIONS SEEM WELL CONTROLED AT THIS TIME WILL CTM AND SUCTION AS NEEDED.
MEDICATED WITH SL ROXANOL AT 20MG DOSE REDUCED O2 TO 1L VIA NC. PT SEEMS TO BE
TOLLERATING THE LOWER O2 WITHOUT DISTRESS AT THIS TIME WITH THE HIGHER DOSE OF
ROXANOL. WILL CONTINUE TO MEDICATE AS RECOMENDED BY PALLIATIVE CARE.

## 2022-04-22 NOTE — NUR
PT IS ON COMFORT CARE. PT WAS MEDICATED PER EMAR FOR PAIN ROXANOL Q1H DURING
SHIFT. ONE DOSE OF ATIVAN WAS GIVEN CRUSHED AND MIXED WITH ROXANOL. ATROPIN
DROPS WERE ADMINSTERED TWICE DURING SHIFT. BEDSIDE SUCTIONING WAS PERFORMED
PRN. PT EXHIBITED BRIEF EPISODES OF CHEYENE STOKE RESPIRATIONS WITH APNEIC
EPISODES LASTING UP TO 10-15 SECONDS. RT WAS NOTIFIED OF PT INABILITY TO CLEAR
SECRETIONS AND PERFORMED DEEP SUCTIONING AND NEBULIZER TREATMENT. REFER TO RT
NOTES. PT'S CAREGIVING TEAM WAS PRESENT AND THERE WAS ALWAYS ONE BEDSIDE TO
COMFORT THE PT. THE PT IS CURRENTLY SLEEPING AND COMFORTABLE. PLAN OF CARE TO
BE CONTINUED AS PRESCRIBED.